# Patient Record
Sex: FEMALE | Race: ASIAN | NOT HISPANIC OR LATINO | Employment: FULL TIME | URBAN - METROPOLITAN AREA
[De-identification: names, ages, dates, MRNs, and addresses within clinical notes are randomized per-mention and may not be internally consistent; named-entity substitution may affect disease eponyms.]

---

## 2023-05-03 ENCOUNTER — OFFICE VISIT (OUTPATIENT)
Dept: FAMILY MEDICINE CLINIC | Facility: CLINIC | Age: 43
End: 2023-05-03
Payer: COMMERCIAL

## 2023-05-03 VITALS
TEMPERATURE: 98.6 F | HEART RATE: 74 BPM | OXYGEN SATURATION: 98 % | HEIGHT: 66 IN | BODY MASS INDEX: 21.38 KG/M2 | SYSTOLIC BLOOD PRESSURE: 122 MMHG | WEIGHT: 133 LBS | DIASTOLIC BLOOD PRESSURE: 88 MMHG

## 2023-05-03 DIAGNOSIS — F41.9 ANXIETY: ICD-10-CM

## 2023-05-03 DIAGNOSIS — J45.20 MILD INTERMITTENT ASTHMA WITHOUT COMPLICATION: ICD-10-CM

## 2023-05-03 DIAGNOSIS — Z00.00 ANNUAL PHYSICAL EXAM: Primary | ICD-10-CM

## 2023-05-03 PROCEDURE — 99386 PREV VISIT NEW AGE 40-64: CPT | Performed by: STUDENT IN AN ORGANIZED HEALTH CARE EDUCATION/TRAINING PROGRAM

## 2023-05-03 RX ORDER — ALBUTEROL SULFATE 90 UG/1
1 AEROSOL, METERED RESPIRATORY (INHALATION)
Qty: 8 G | Refills: 2 | Status: SHIPPED | OUTPATIENT
Start: 2023-05-03

## 2023-05-03 RX ORDER — ALBUTEROL SULFATE 90 UG/1
AEROSOL, METERED RESPIRATORY (INHALATION)
COMMUNITY
Start: 2023-01-07 | End: 2023-05-03 | Stop reason: SDUPTHER

## 2023-05-03 RX ORDER — ESCITALOPRAM OXALATE 10 MG/1
10 TABLET ORAL DAILY
Qty: 30 TABLET | Refills: 0 | Status: SHIPPED | OUTPATIENT
Start: 2023-05-03

## 2023-05-03 NOTE — ASSESSMENT & PLAN NOTE
- AGAPITO-7 of 19  - We will start Lexapro 10 mg daily  - We will place referral to behavioral health for counseling

## 2023-05-03 NOTE — ASSESSMENT & PLAN NOTE
- Patient does use her albuterol inhaler daily when she has panic attacks, reports that when her anxiety was better controlled, she needed to use the albuterol inhaler only once every 6 months  - We will continue albuterol as needed  - We will continue to monitor and continues to use daily, will need pulmonary function testing

## 2023-05-03 NOTE — PATIENT INSTRUCTIONS
Health Maintenance, Female  Adopting a healthy lifestyle and getting preventive care can go a long way to promote health and wellness. Talk with your health care provider about what schedule of regular examinations is right for you. This is a good chance for you to check in with your provider about disease prevention and staying healthy.  In between checkups, there are plenty of things you can do on your own. Experts have done a lot of research about which lifestyle changes and preventive measures are most likely to keep you healthy. Ask your health care provider for more information.  Weight and diet  Eat a healthy diet  Be sure to include plenty of vegetables, fruits, low-fat dairy products, and lean protein.  Do not eat a lot of foods high in solid fats, added sugars, or salt.  Get regular exercise. This is one of the most important things you can do for your health.  Most adults should exercise for at least 150 minutes each week. The exercise should increase your heart rate and make you sweat (moderate-intensity exercise).  Most adults should also do strengthening exercises at least twice a week. This is in addition to the moderate-intensity exercise.     Maintain a healthy weight  Body mass index (BMI) is a measurement that can be used to identify possible weight problems. It estimates body fat based on height and weight. Your health care provider can help determine your BMI and help you achieve or maintain a healthy weight.  For females 20 years of age and older:  A BMI below 18.5 is considered underweight.  A BMI of 18.5 to 24.9 is normal.  A BMI of 25 to 29.9 is considered overweight.  A BMI of 30 and above is considered obese.     Watch levels of cholesterol and blood lipids  You should start having your blood tested for lipids and cholesterol at 20 years of age, then have this test every 5 years.  You may need to have your cholesterol levels checked more often if:  Your lipid or cholesterol levels are  high.  You are older than 50 years of age.  You are at high risk for heart disease.     Cancer screening  Lung Cancer  Lung cancer screening is recommended for adults 55-80 years old who are at high risk for lung cancer because of a history of smoking.  A yearly low-dose CT scan of the lungs is recommended for people who:  Currently smoke.  Have quit within the past 15 years.  Have at least a 30-pack-year history of smoking. A pack year is smoking an average of one pack of cigarettes a day for 1 year.  Yearly screening should continue until it has been 15 years since you quit.  Yearly screening should stop if you develop a health problem that would prevent you from having lung cancer treatment.     Breast Cancer  Practice breast self-awareness. This means understanding how your breasts normally appear and feel.  It also means doing regular breast self-exams. Let your health care provider know about any changes, no matter how small.  If you are in your 20s or 30s, you should have a clinical breast exam (CBE) by a health care provider every 1-3 years as part of a regular health exam.  If you are 40 or older, have a CBE every year. Also consider having a breast X-ray (mammogram) every year.  If you have a family history of breast cancer, talk to your health care provider about genetic screening.  If you are at high risk for breast cancer, talk to your health care provider about having an MRI and a mammogram every year.  Breast cancer gene (BRCA) assessment is recommended for women who have family members with BRCA-related cancers. BRCA-related cancers include:  Breast.  Ovarian.  Tubal.  Peritoneal cancers.  Results of the assessment will determine the need for genetic counseling and BRCA1 and BRCA2 testing.     Cervical Cancer  Your health care provider may recommend that you be screened regularly for cancer of the pelvic organs (ovaries, uterus, and vagina). This screening involves a pelvic examination, including  checking for microscopic changes to the surface of your cervix (Pap test). You may be encouraged to have this screening done every 3 years, beginning at age 21.  For women ages 30-65, health care providers may recommend pelvic exams and Pap testing every 3 years, or they may recommend the Pap and pelvic exam, combined with testing for human papilloma virus (HPV), every 5 years. Some types of HPV increase your risk of cervical cancer. Testing for HPV may also be done on women of any age with unclear Pap test results.  Other health care providers may not recommend any screening for nonpregnant women who are considered low risk for pelvic cancer and who do not have symptoms. Ask your health care provider if a screening pelvic exam is right for you.  If you have had past treatment for cervical cancer or a condition that could lead to cancer, you need Pap tests and screening for cancer for at least 20 years after your treatment. If Pap tests have been discontinued, your risk factors (such as having a new sexual partner) need to be reassessed to determine if screening should resume. Some women have medical problems that increase the chance of getting cervical cancer. In these cases, your health care provider may recommend more frequent screening and Pap tests.     Colorectal Cancer  This type of cancer can be detected and often prevented.  Routine colorectal cancer screening usually begins at 50 years of age and continues through 75 years of age.  Your health care provider may recommend screening at an earlier age if you have risk factors for colon cancer.  Your health care provider may also recommend using home test kits to check for hidden blood in the stool.  A small camera at the end of a tube can be used to examine your colon directly (sigmoidoscopy or colonoscopy). This is done to check for the earliest forms of colorectal cancer.  Routine screening usually begins at age 50.  Direct examination of the colon should  be repeated every 5-10 years through 75 years of age. However, you may need to be screened more often if early forms of precancerous polyps or small growths are found.     Skin Cancer  Check your skin from head to toe regularly.  Tell your health care provider about any new moles or changes in moles, especially if there is a change in a mole's shape or color.  Also tell your health care provider if you have a mole that is larger than the size of a pencil eraser.  Always use sunscreen. Apply sunscreen liberally and repeatedly throughout the day.  Protect yourself by wearing long sleeves, pants, a wide-brimmed hat, and sunglasses whenever you are outside.     Heart disease, diabetes, and high blood pressure  High blood pressure causes heart disease and increases the risk of stroke. High blood pressure is more likely to develop in:  People who have blood pressure in the high end of the normal range (130-139/85-89 mm Hg).  People who are overweight or obese.  People who are .  If you are 18-39 years of age, have your blood pressure checked every 3-5 years. If you are 40 years of age or older, have your blood pressure checked every year. You should have your blood pressure measured twice--once when you are at a hospital or clinic, and once when you are not at a hospital or clinic. Record the average of the two measurements. To check your blood pressure when you are not at a hospital or clinic, you can use:  An automated blood pressure machine at a pharmacy.  A home blood pressure monitor.  If you are between 55 years and 79 years old, ask your health care provider if you should take aspirin to prevent strokes.  Have regular diabetes screenings. This involves taking a blood sample to check your fasting blood sugar level.  If you are at a normal weight and have a low risk for diabetes, have this test once every three years after 45 years of age.  If you are overweight and have a high risk for diabetes,  consider being tested at a younger age or more often.  Preventing infection  Hepatitis B  If you have a higher risk for hepatitis B, you should be screened for this virus. You are considered at high risk for hepatitis B if:  You were born in a country where hepatitis B is common. Ask your health care provider which countries are considered high risk.  Your parents were born in a high-risk country, and you have not been immunized against hepatitis B (hepatitis B vaccine).  You have HIV or AIDS.  You use needles to inject street drugs.  You live with someone who has hepatitis B.  You have had sex with someone who has hepatitis B.  You get hemodialysis treatment.  You take certain medicines for conditions, including cancer, organ transplantation, and autoimmune conditions.     Hepatitis C  Blood testing is recommended for:  Everyone born from 1945 through 1965.  Anyone with known risk factors for hepatitis C.     Sexually transmitted infections (STIs)  You should be screened for sexually transmitted infections (STIs) including gonorrhea and chlamydia if:  You are sexually active and are younger than 24 years of age.  You are older than 24 years of age and your health care provider tells you that you are at risk for this type of infection.  Your sexual activity has changed since you were last screened and you are at an increased risk for chlamydia or gonorrhea. Ask your health care provider if you are at risk.  If you do not have HIV, but are at risk, it may be recommended that you take a prescription medicine daily to prevent HIV infection. This is called pre-exposure prophylaxis (PrEP). You are considered at risk if:  You are sexually active and do not regularly use condoms or know the HIV status of your partner(s).  You take drugs by injection.  You are sexually active with a partner who has HIV.     Talk with your health care provider about whether you are at high risk of being infected with HIV. If you choose to  begin PrEP, you should first be tested for HIV. You should then be tested every 3 months for as long as you are taking PrEP.  Pregnancy  If you are premenopausal and you may become pregnant, ask your health care provider about preconception counseling.  If you may become pregnant, take 400 to 800 micrograms (mcg) of folic acid every day.  If you want to prevent pregnancy, talk to your health care provider about birth control (contraception).  Osteoporosis and menopause  Osteoporosis is a disease in which the bones lose minerals and strength with aging. This can result in serious bone fractures. Your risk for osteoporosis can be identified using a bone density scan.  If you are 65 years of age or older, or if you are at risk for osteoporosis and fractures, ask your health care provider if you should be screened.  Ask your health care provider whether you should take a calcium or vitamin D supplement to lower your risk for osteoporosis.  Menopause may have certain physical symptoms and risks.  Hormone replacement therapy may reduce some of these symptoms and risks.  Talk to your health care provider about whether hormone replacement therapy is right for you.  Follow these instructions at home:  Schedule regular health, dental, and eye exams.  Stay current with your immunizations.  Do not use any tobacco products including cigarettes, chewing tobacco, or electronic cigarettes.  If you are pregnant, do not drink alcohol.  If you are breastfeeding, limit how much and how often you drink alcohol.  Limit alcohol intake to no more than 1 drink per day for nonpregnant women. One drink equals 12 ounces of beer, 5 ounces of wine, or 1½ ounces of hard liquor.  Do not use street drugs.  Do not share needles.  Ask your health care provider for help if you need support or information about quitting drugs.  Tell your health care provider if you often feel depressed.  Tell your health care provider if you have ever been abused or do  not feel safe at home.  This information is not intended to replace advice given to you by your health care provider. Make sure you discuss any questions you have with your health care provider.  Document Released: 07/02/2012 Document Revised: 05/25/2017 Document Reviewed: 09/20/2016  [employer] Interactive Patient Education © 2018 [employer]

## 2023-05-03 NOTE — ASSESSMENT & PLAN NOTE
- Discussed diet and exercise: At least 30 minutes of exercise 5 times per day and eating a variety of fresh fruits, vegetables, lean proteins limiting fatty food intake  - Patient information provided in AVS  - Patient is up-to-date on Tdap, declines COVID-vaccine  - Patient is due for Pap smear, will place gynecology referral per patient request  - Obtaining basic screening labs today

## 2023-05-03 NOTE — PROGRESS NOTES
"    Office Note     Name: Twin Linton    : 1980     MRN: [id number]     Chief Complaint  Anxiety (Always have had anxiety but since November it has become worse since starting IVF ) and Labs Only    Subjective     History of Present Illness:  Twin Linton is a 42 y.o. female who presents today for anxiety and labs.  Patient is a new patient to our clinic.    Anxiety: Patient reports that she has always been fairly anxious, but has been able to suppress it with meditation.  She started IVF in November and reports that she has been a lot more anxious since then.  She has gone through 2 cycles of IVF and has been unsuccessful.  She reports that she is going through a lot and does not feel like anyone understands her.  She is very tearful today.  She would like to start medicine and counseling.  She reports that she has been getting panic attacks.  It feels like she needs to snap and she needs to use her albuterol inhaler to help with her breathing when this happens.    Asthma: Has been using it daily since her anxiety has heightened.  Previously, was using it once every 6 months.  She would like a refill on her medicine today.    HM:  -Not interested in COVID-vaccine  -Reports that she has had her tetanus shot in the last 10 years  -Due for Pap, would like gynecology referral    SH:  -Denies smoking, drinking, recreational drug use  -Lives part of her time in [place] and part of the time here  -Lives with her significant other  -Does exercise try to eat healthy        Objective     History reviewed. No pertinent past medical history.  History reviewed. No pertinent surgical history.  History reviewed. No pertinent family history.    Vital Signs  /88   Pulse 74   Temp 98.6 °F (37 °C) (Infrared)   Ht 167.6 cm (66\") Comment: pt states  Wt 60.3 kg (133 lb)   SpO2 98%   BMI 21.47 kg/m²   Estimated body mass index is 21.47 kg/m² as calculated from the following:    Height as of this encounter: " "167.6 cm (66\").    Weight as of this encounter: 60.3 kg (133 lb).    Physical Exam  Vitals reviewed.   Constitutional:       Appearance: Normal appearance.   HENT:      Head: Normocephalic.      Right Ear: External ear normal.      Left Ear: External ear normal.      Nose: Nose normal.      Mouth/Throat:      Mouth: Mucous membranes are moist.   Eyes:      Extraocular Movements: Extraocular movements intact.   Cardiovascular:      Rate and Rhythm: Normal rate and regular rhythm.      Heart sounds: Normal heart sounds.   Pulmonary:      Effort: Pulmonary effort is normal. No respiratory distress.      Breath sounds: Normal breath sounds.   Abdominal:      General: Abdomen is flat.      Palpations: Abdomen is soft.   Musculoskeletal:      Cervical back: No rigidity.      Comments: Moving all extremities spontaneously   Skin:     General: Skin is warm and dry.   Neurological:      General: No focal deficit present.      Mental Status: She is alert and oriented to person, place, and time.   Psychiatric:         Mood and Affect: Mood normal.         Behavior: Behavior normal.               Assessment and Plan     Diagnoses and all orders for this visit:    1. Annual physical exam (Primary)  Assessment & Plan:  - Discussed diet and exercise: At least 30 minutes of exercise 5 times per day and eating a variety of fresh fruits, vegetables, lean proteins limiting fatty food intake  - Patient information provided in AVS  - Patient is up-to-date on Tdap, declines COVID-vaccine  - Patient is due for Pap smear, will place gynecology referral per patient request  - Obtaining basic screening labs today    Orders:  -     TSH Rfx On Abnormal To Free T4; Future  -     Hemoglobin A1c; Future  -     Lipid Panel; Future  -     Hepatitis C Antibody; Future  -     Comprehensive Metabolic Panel; Future  -     CBC & Differential; Future  -     Vitamin B12; Future  -     Ambulatory Referral to Gynecology    2. Anxiety  Assessment & Plan:  - " AGAPITO-7 of 19  - We will start Lexapro 10 mg daily  - We will place referral to behavioral health for counseling    Orders:  -     Cancel: Ambulatory Referral to Behavioral Health  -     escitalopram (Lexapro) 10 MG tablet; Take 1 tablet by mouth Daily.  Dispense: 30 tablet; Refill: 0  -     Ambulatory Referral to Behavioral Health    3. Mild intermittent asthma without complication  Assessment & Plan:   - Patient does use her albuterol inhaler daily when she has panic attacks, reports that when her anxiety was better controlled, she needed to use the albuterol inhaler only once every 6 months  - We will continue albuterol as needed  - We will continue to monitor and continues to use daily, will need pulmonary function testing      Orders:  -     albuterol sulfate  (90 Base) MCG/ACT inhaler; Inhale 1 puff 4 (Four) Times a Day.  Dispense: 8 g; Refill: 2       BMI is within normal parameters. No other follow-up for BMI required.    Follow Up  Return in about 2 weeks (around 5/17/2023) for anxiety.    Pooja Wang MD

## 2023-05-12 ENCOUNTER — LAB (OUTPATIENT)
Dept: LAB | Facility: HOSPITAL | Age: 43
End: 2023-05-12
Payer: COMMERCIAL

## 2023-05-12 ENCOUNTER — OFFICE VISIT (OUTPATIENT)
Dept: FAMILY MEDICINE CLINIC | Facility: CLINIC | Age: 43
End: 2023-05-12
Payer: COMMERCIAL

## 2023-05-12 VITALS
DIASTOLIC BLOOD PRESSURE: 84 MMHG | WEIGHT: 137.2 LBS | HEIGHT: 66 IN | OXYGEN SATURATION: 98 % | HEART RATE: 65 BPM | TEMPERATURE: 97.8 F | SYSTOLIC BLOOD PRESSURE: 118 MMHG | BODY MASS INDEX: 22.05 KG/M2

## 2023-05-12 DIAGNOSIS — F41.9 ANXIETY: Primary | ICD-10-CM

## 2023-05-12 DIAGNOSIS — Z00.00 ANNUAL PHYSICAL EXAM: ICD-10-CM

## 2023-05-12 PROCEDURE — 36415 COLL VENOUS BLD VENIPUNCTURE: CPT

## 2023-05-12 PROCEDURE — 99213 OFFICE O/P EST LOW 20 MIN: CPT | Performed by: STUDENT IN AN ORGANIZED HEALTH CARE EDUCATION/TRAINING PROGRAM

## 2023-05-12 PROCEDURE — 80061 LIPID PANEL: CPT

## 2023-05-12 PROCEDURE — 86803 HEPATITIS C AB TEST: CPT

## 2023-05-12 PROCEDURE — 84443 ASSAY THYROID STIM HORMONE: CPT

## 2023-05-12 PROCEDURE — 83036 HEMOGLOBIN GLYCOSYLATED A1C: CPT

## 2023-05-12 PROCEDURE — 80053 COMPREHEN METABOLIC PANEL: CPT

## 2023-05-12 PROCEDURE — 85025 COMPLETE CBC W/AUTO DIFF WBC: CPT

## 2023-05-12 PROCEDURE — 82607 VITAMIN B-12: CPT

## 2023-05-12 RX ORDER — BUSPIRONE HYDROCHLORIDE 10 MG/1
10 TABLET ORAL 2 TIMES DAILY
Qty: 60 TABLET | Refills: 0 | Status: SHIPPED | OUTPATIENT
Start: 2023-05-12

## 2023-05-12 NOTE — PROGRESS NOTES
"    Office Note     Name: Twin Linton    : 1980     MRN: [id number]     Chief Complaint  Anxiety    Subjective     History of Present Illness:  Twin Linton is a 42 y.o. female who presents today for anxiety.  At her last visit, we started her on Lexapro 10 mg daily.  AGAPITO-7 at that time was a 19.  She reports that she took it a total of 4 times since the last time she saw me and did not like how it made her feel.  She had poor concentration, it made her very tired, and she just stopped taking it.  Additionally, she has not started counseling yet.          Objective     History reviewed. No pertinent past medical history.  History reviewed. No pertinent surgical history.  History reviewed. No pertinent family history.    Vital Signs  /84   Pulse 65   Temp 97.8 °F (36.6 °C)   Ht 167.6 cm (66\")   Wt 62.2 kg (137 lb 3.2 oz)   SpO2 98%   BMI 22.14 kg/m²   Estimated body mass index is 22.14 kg/m² as calculated from the following:    Height as of this encounter: 167.6 cm (66\").    Weight as of this encounter: 62.2 kg (137 lb 3.2 oz).    Physical Exam  Vitals reviewed.   Constitutional:       Appearance: Normal appearance.   HENT:      Head: Normocephalic and atraumatic.   Cardiovascular:      Rate and Rhythm: Normal rate and regular rhythm.   Pulmonary:      Effort: Pulmonary effort is normal.      Breath sounds: Normal breath sounds.   Neurological:      Mental Status: She is alert.            Assessment and Plan     Diagnoses and all orders for this visit:    1. Anxiety (Primary)  Assessment & Plan:  - Stable, unchanged  - Patient did not like how Lexapro made her feel and would like something to be used as needed  - We will start BuSpar 10 mg twice daily, encouraged her to try to use this on a daily basis and not just as needed  - Encouraged patient to get a counselor, information given at last visit  - Return to clinic in 2 weeks for reevaluation      Other orders  -     busPIRone (BUSPAR) 10 " 3 MG tablet; Take 1 tablet by mouth 2 (Two) Times a Day.  Dispense: 60 tablet; Refill: 0    BMI is within normal parameters. No other follow-up for BMI required.      Follow Up  Return in about 2 weeks (around 5/26/2023) for anxiety.    Pooja Wang MD

## 2023-05-12 NOTE — ASSESSMENT & PLAN NOTE
- Stable, unchanged  - Patient did not like how Lexapro made her feel and would like something to be used as needed  - We will start BuSpar 10 mg twice daily, encouraged her to try to use this on a daily basis and not just as needed  - Encouraged patient to get a counselor, information given at last visit  - Return to clinic in 2 weeks for reevaluation

## 2023-05-13 LAB
ALBUMIN SERPL-MCNC: 4.5 G/DL (ref 3.5–5.2)
ALBUMIN/GLOB SERPL: 1.5 G/DL
ALP SERPL-CCNC: 53 U/L (ref 39–117)
ALT SERPL W P-5'-P-CCNC: 7 U/L (ref 1–33)
ANION GAP SERPL CALCULATED.3IONS-SCNC: 12 MMOL/L (ref 5–15)
AST SERPL-CCNC: 24 U/L (ref 1–32)
BASOPHILS # BLD AUTO: 0.06 10*3/MM3 (ref 0–0.2)
BASOPHILS NFR BLD AUTO: 1 % (ref 0–1.5)
BILIRUB SERPL-MCNC: 0.6 MG/DL (ref 0–1.2)
BUN SERPL-MCNC: 12 MG/DL (ref 6–20)
BUN/CREAT SERPL: 14 (ref 7–25)
CALCIUM SPEC-SCNC: 9.1 MG/DL (ref 8.6–10.5)
CHLORIDE SERPL-SCNC: 101 MMOL/L (ref 98–107)
CHOLEST SERPL-MCNC: 192 MG/DL (ref 0–200)
CO2 SERPL-SCNC: 24 MMOL/L (ref 22–29)
CREAT SERPL-MCNC: 0.86 MG/DL (ref 0.57–1)
DEPRECATED RDW RBC AUTO: 42.5 FL (ref 37–54)
EGFRCR SERPLBLD CKD-EPI 2021: 86.6 ML/MIN/1.73
EOSINOPHIL # BLD AUTO: 0.13 10*3/MM3 (ref 0–0.4)
EOSINOPHIL NFR BLD AUTO: 2.1 % (ref 0.3–6.2)
ERYTHROCYTE [DISTWIDTH] IN BLOOD BY AUTOMATED COUNT: 13.3 % (ref 12.3–15.4)
GLOBULIN UR ELPH-MCNC: 3.1 GM/DL
GLUCOSE SERPL-MCNC: 76 MG/DL (ref 65–99)
HBA1C MFR BLD: 4.6 % (ref 4.8–5.6)
HCT VFR BLD AUTO: 41.9 % (ref 34–46.6)
HCV AB SER DONR QL: NORMAL
HDLC SERPL-MCNC: 86 MG/DL (ref 40–60)
HGB BLD-MCNC: 13.6 G/DL (ref 12–15.9)
IMM GRANULOCYTES # BLD AUTO: 0.01 10*3/MM3 (ref 0–0.05)
IMM GRANULOCYTES NFR BLD AUTO: 0.2 % (ref 0–0.5)
LDLC SERPL CALC-MCNC: 85 MG/DL (ref 0–100)
LDLC/HDLC SERPL: 0.95 {RATIO}
LYMPHOCYTES # BLD AUTO: 1.85 10*3/MM3 (ref 0.7–3.1)
LYMPHOCYTES NFR BLD AUTO: 29.6 % (ref 19.6–45.3)
MCH RBC QN AUTO: 28.7 PG (ref 26.6–33)
MCHC RBC AUTO-ENTMCNC: 32.5 G/DL (ref 31.5–35.7)
MCV RBC AUTO: 88.4 FL (ref 79–97)
MONOCYTES # BLD AUTO: 0.62 10*3/MM3 (ref 0.1–0.9)
MONOCYTES NFR BLD AUTO: 9.9 % (ref 5–12)
NEUTROPHILS NFR BLD AUTO: 3.57 10*3/MM3 (ref 1.7–7)
NEUTROPHILS NFR BLD AUTO: 57.2 % (ref 42.7–76)
NRBC BLD AUTO-RTO: 0 /100 WBC (ref 0–0.2)
PLATELET # BLD AUTO: 293 10*3/MM3 (ref 140–450)
PMV BLD AUTO: 9.9 FL (ref 6–12)
POTASSIUM SERPL-SCNC: 4 MMOL/L (ref 3.5–5.2)
PROT SERPL-MCNC: 7.6 G/DL (ref 6–8.5)
RBC # BLD AUTO: 4.74 10*6/MM3 (ref 3.77–5.28)
SODIUM SERPL-SCNC: 137 MMOL/L (ref 136–145)
TRIGL SERPL-MCNC: 121 MG/DL (ref 0–150)
TSH SERPL DL<=0.05 MIU/L-ACNC: 0.56 UIU/ML (ref 0.27–4.2)
VIT B12 BLD-MCNC: 654 PG/ML (ref 211–946)
VLDLC SERPL-MCNC: 21 MG/DL (ref 5–40)
WBC NRBC COR # BLD: 6.24 10*3/MM3 (ref 3.4–10.8)

## 2023-06-05 ENCOUNTER — OFFICE VISIT (OUTPATIENT)
Dept: FAMILY MEDICINE CLINIC | Facility: CLINIC | Age: 43
End: 2023-06-05
Payer: COMMERCIAL

## 2023-06-05 VITALS
SYSTOLIC BLOOD PRESSURE: 138 MMHG | HEIGHT: 66 IN | OXYGEN SATURATION: 98 % | BODY MASS INDEX: 22.53 KG/M2 | HEART RATE: 62 BPM | DIASTOLIC BLOOD PRESSURE: 86 MMHG | WEIGHT: 140.2 LBS | TEMPERATURE: 98.2 F

## 2023-06-05 DIAGNOSIS — Z71.3 ENCOUNTER FOR WEIGHT LOSS COUNSELING: ICD-10-CM

## 2023-06-05 DIAGNOSIS — F41.9 ANXIETY: Primary | ICD-10-CM

## 2023-06-05 PROBLEM — M79.641 PAIN OF RIGHT HAND: Status: ACTIVE | Noted: 2022-08-03

## 2023-06-05 PROBLEM — M25.522 BILATERAL ELBOW JOINT PAIN: Status: ACTIVE | Noted: 2022-11-02

## 2023-06-05 PROBLEM — M25.521 BILATERAL ELBOW JOINT PAIN: Status: ACTIVE | Noted: 2022-11-02

## 2023-06-05 PROCEDURE — 99214 OFFICE O/P EST MOD 30 MIN: CPT | Performed by: STUDENT IN AN ORGANIZED HEALTH CARE EDUCATION/TRAINING PROGRAM

## 2023-06-05 RX ORDER — BUSPIRONE HYDROCHLORIDE 10 MG/1
10 TABLET ORAL 2 TIMES DAILY
Qty: 60 TABLET | Refills: 1 | Status: SHIPPED | OUTPATIENT
Start: 2023-06-05 | End: 2023-06-05 | Stop reason: SDUPTHER

## 2023-06-05 RX ORDER — BUSPIRONE HYDROCHLORIDE 10 MG/1
10 TABLET ORAL 2 TIMES DAILY
Qty: 60 TABLET | Refills: 2 | Status: SHIPPED | OUTPATIENT
Start: 2023-06-05

## 2023-06-05 NOTE — PROGRESS NOTES
"    Office Note     Name: Twin Linton    : 1980     MRN: [id number]     Chief Complaint  Anxiety (2 Week Follow up )    Subjective     History of Present Illness:  Twin Linton is a 43 y.o. female who presents today for anxiety.  We started her on BuSpar 10 mg twice daily.  Patient reports that she has not been consistently taking her medication, but when she takes it, it does help.  She feels that her anxiety has improved.  She has stopped IVF for now which has also helped with her anxiety.  Reports that she has gained weight since doing IVF, would like to lose 20 pounds.  She would like to start semaglutide.          Objective     History reviewed. No pertinent past medical history.  No past surgical history on file.  History reviewed. No pertinent family history.    Vital Signs  /86   Pulse 62   Temp 98.2 °F (36.8 °C) (Infrared)   Ht 167.6 cm (66\")   Wt 63.6 kg (140 lb 3.2 oz)   SpO2 98%   BMI 22.63 kg/m²   Estimated body mass index is 22.63 kg/m² as calculated from the following:    Height as of this encounter: 167.6 cm (66\").    Weight as of this encounter: 63.6 kg (140 lb 3.2 oz).    Physical Exam  Vitals reviewed.   Constitutional:       Appearance: Normal appearance.   Cardiovascular:      Rate and Rhythm: Normal rate and regular rhythm.   Pulmonary:      Effort: Pulmonary effort is normal.   Abdominal:      General: Abdomen is flat.      Palpations: Abdomen is soft.   Musculoskeletal:      Comments: Moving all extremities spontaneously   Skin:     General: Skin is warm and dry.   Neurological:      Mental Status: She is alert.      Comments: Alert and oriented   Psychiatric:         Mood and Affect: Mood normal.         Behavior: Behavior normal.             Assessment and Plan     Diagnoses and all orders for this visit:    1. Anxiety (Primary)  Assessment & Plan:  - Improved  - Continue BuSpar 10 mg daily, advised patient to use daily    Orders:  -     busPIRone (BUSPAR) 10 MG " tablet; Take 1 tablet by mouth 2 (Two) Times a Day.  Dispense: 60 tablet; Refill: 2    2. Encounter for weight loss counseling  Assessment & Plan:  - Just discussed with patient that she is within a healthy BMI and there are no FDA approved medications to help someone lose weight even more  - I did discuss diet and exercise as her best options      Other orders  -     Discontinue: busPIRone (BUSPAR) 10 MG tablet; Take 1 tablet by mouth 2 (Two) Times a Day.  Dispense: 60 tablet; Refill: 1        BMI is within normal parameters. No other follow-up for BMI required.    Follow Up  Return in about 3 months (around 9/5/2023) for follow up anxiety.    Pooja Wang MD

## 2023-06-05 NOTE — ASSESSMENT & PLAN NOTE
- Just discussed with patient that she is within a healthy BMI and there are no FDA approved medications to help someone lose weight even more  - I did discuss diet and exercise as her best options

## 2023-06-29 ENCOUNTER — TELEPHONE (OUTPATIENT)
Dept: OBSTETRICS AND GYNECOLOGY | Facility: CLINIC | Age: 43
End: 2023-06-29

## 2023-06-29 NOTE — TELEPHONE ENCOUNTER
Provider: MARIANNA OSPINA   Caller: EDMAR MARTIN  Phone Number: [phone]  Reason for Call: SAME DAY CANCELLATION//IS OUT OF STATE//WILL CALL BACK TO RESCHEDULE

## 2024-02-22 DIAGNOSIS — J45.20 MILD INTERMITTENT ASTHMA WITHOUT COMPLICATION: ICD-10-CM

## 2024-02-22 RX ORDER — ALBUTEROL SULFATE 90 UG/1
1 AEROSOL, METERED RESPIRATORY (INHALATION) 4 TIMES DAILY
Qty: 8.5 G | Refills: 0 | Status: SHIPPED | OUTPATIENT
Start: 2024-02-22

## 2024-04-13 DIAGNOSIS — J45.20 MILD INTERMITTENT ASTHMA WITHOUT COMPLICATION: ICD-10-CM

## 2024-04-15 RX ORDER — ALBUTEROL SULFATE 90 UG/1
1 AEROSOL, METERED RESPIRATORY (INHALATION) 4 TIMES DAILY
Qty: 8.5 G | Refills: 0 | Status: SHIPPED | OUTPATIENT
Start: 2024-04-15